# Patient Record
Sex: FEMALE | Race: WHITE | NOT HISPANIC OR LATINO | Employment: UNEMPLOYED | URBAN - METROPOLITAN AREA
[De-identification: names, ages, dates, MRNs, and addresses within clinical notes are randomized per-mention and may not be internally consistent; named-entity substitution may affect disease eponyms.]

---
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
## 2024-02-21 PROBLEM — J02.9 ACUTE PHARYNGITIS: Status: RESOLVED | Noted: 2018-10-04 | Resolved: 2024-02-21

## 2024-12-26 ENCOUNTER — DOCUMENTATION (OUTPATIENT)
Dept: PSYCHIATRY | Facility: CLINIC | Age: 19
End: 2024-12-26

## 2024-12-26 ENCOUNTER — TELEPHONE (OUTPATIENT)
Dept: PSYCHIATRY | Facility: CLINIC | Age: 19
End: 2024-12-26

## 2024-12-26 NOTE — LETTER
12/26/24       [place] [id number]       Dear Edna Serra   It has been our pleasure to serve your needs. Since you are no longer interested in continuing your treatment with Shai Voss MD at [facility], your chart is being closed at this time.    If you wish to return to [facility], you will need to have another initial assessment and intake appointment. Please call [phone] to schedule a new psychiatric intake if you are interested in doing so.      Please follow-up with your primary care provider for continual care.  When you have scheduled an appointment with another agency, please feel free to complete a release of information so that your records can be transferred to your new provider prior to your first appointment.  This will aid with the continuity of your care.      Sincerely,           Shai Voss MD     [facility]        We will continue to provide psychotropic medications and/or emergency counseling as deemed appropriate by clinical staff for 45 days from receipt of this letter.                For a referral to another agency, we would suggest that you contact your primary health care provider or insurance company.   Please see Provider's List of agencies below:    Outpatient Mental Health Adult  [facility].  [address].  [place].  [phone]   Remington Castillo MD to [address] [facility].  [place]. [phone]   [address] [place]. [phone]   Susan Patterson MD [address] [place] [address]. [phone]   Michael Chopra MD, [address] , [place]. [phone]   Outpatient Mental Health Children, Adolescents and Family  [facility] [address]: [address] [place] [address] [phone]  [facility] [address]  [place] [address]  and [place] [address], [address]   [phone]  [facility] (14  and over)  [address] [place]  [phone]  Outpatient Mental Health [place]  [facility] [address] [place] [address] [phone]  [facility]:   [place] [address] Phone: [phone]  [facility] Outpatient:[place], [address] [place] [address] New Admissions [phone] Local [phone]  [facility]:   [facility] :[address] [facility] [address] [place], [place] [address] Phone: [phone]  [facility] Outpatient: [address] [place] [address] Phone: New Admissions [phone] / Local Office [phone]  Drug & Alcohol Services:  [facility]:   [phone] or [phone]  [facility]:  [phone] or [phone]  [facility]:  [phone]  [facility]:  [phone]  [facility]: [phone]    [facility]:   [facility]:  [address] [address]  Phone: [phone]  [facility] CRISIS NUMBERS:    [place]:  [phone]    [place]: [phone] or [phone]    [place] / [place]: [phone]    [place]: [phone]    [place]: [phone]    [place]: [phone] ([phone])    [place]: [phone]    National Suicide Prevention Hotline:  1-825.879.7826 (TALK)

## 2024-12-27 NOTE — TELEPHONE ENCOUNTER
DISCHARGE LETTER for Shai Voss MD (certified and regular) placed in outgoing mail on 12/27/24.    Article #:  [id number]    Address:  [place] [address]